# Patient Record
Sex: MALE | Race: WHITE | NOT HISPANIC OR LATINO | Employment: FULL TIME | URBAN - METROPOLITAN AREA
[De-identification: names, ages, dates, MRNs, and addresses within clinical notes are randomized per-mention and may not be internally consistent; named-entity substitution may affect disease eponyms.]

---

## 2019-09-19 ENCOUNTER — HOSPITAL ENCOUNTER (EMERGENCY)
Facility: HOSPITAL | Age: 44
Discharge: HOME | End: 2019-09-20
Attending: EMERGENCY MEDICINE

## 2019-09-19 DIAGNOSIS — S09.90XA CLOSED HEAD INJURY, INITIAL ENCOUNTER: ICD-10-CM

## 2019-09-19 DIAGNOSIS — S06.0X1A CONCUSSION WITH LOSS OF CONSCIOUSNESS OF 30 MINUTES OR LESS, INITIAL ENCOUNTER: ICD-10-CM

## 2019-09-19 DIAGNOSIS — R07.9 CHEST PAIN: ICD-10-CM

## 2019-09-19 DIAGNOSIS — S09.90XA INJURY OF HEAD, INITIAL ENCOUNTER: Primary | ICD-10-CM

## 2019-09-19 DIAGNOSIS — S20.219A CONTUSION OF CHEST WALL, UNSPECIFIED LATERALITY, INITIAL ENCOUNTER: ICD-10-CM
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
## 2019-09-19 LAB
ALBUMIN SERPL BCP-MCNC: 4.1 G/DL (ref 3.5–5.2)
ALP SERPL-CCNC: 79 U/L (ref 55–135)
ALT SERPL W/O P-5'-P-CCNC: 41 U/L (ref 10–44)
AMPHET+METHAMPHET UR QL: NEGATIVE
ANION GAP SERPL CALC-SCNC: 13 MMOL/L (ref 8–16)
AST SERPL-CCNC: 31 U/L (ref 10–40)
BARBITURATES UR QL SCN>200 NG/ML: NEGATIVE
BASOPHILS # BLD AUTO: 0.04 K/UL (ref 0–0.2)
BASOPHILS NFR BLD: 0.5 % (ref 0–1.9)
BENZODIAZ UR QL SCN>200 NG/ML: NEGATIVE
BILIRUB SERPL-MCNC: 0.2 MG/DL (ref 0.1–1)
BNP SERPL-MCNC: 14 PG/ML (ref 0–99)
BUN SERPL-MCNC: 10 MG/DL (ref 6–20)
BZE UR QL SCN: NEGATIVE
CALCIUM SERPL-MCNC: 9.3 MG/DL (ref 8.7–10.5)
CANNABINOIDS UR QL SCN: NEGATIVE
CHLORIDE SERPL-SCNC: 106 MMOL/L (ref 95–110)
CO2 SERPL-SCNC: 21 MMOL/L (ref 23–29)
CREAT SERPL-MCNC: 0.9 MG/DL (ref 0.5–1.4)
CREAT UR-MCNC: 22.8 MG/DL (ref 23–375)
DIFFERENTIAL METHOD: ABNORMAL
EOSINOPHIL # BLD AUTO: 0.2 K/UL (ref 0–0.5)
EOSINOPHIL NFR BLD: 2.4 % (ref 0–8)
ERYTHROCYTE [DISTWIDTH] IN BLOOD BY AUTOMATED COUNT: 12.8 % (ref 11.5–14.5)
EST. GFR  (AFRICAN AMERICAN): >60 ML/MIN/1.73 M^2
EST. GFR  (NON AFRICAN AMERICAN): >60 ML/MIN/1.73 M^2
ETHANOL SERPL-MCNC: 138 MG/DL
GLUCOSE SERPL-MCNC: 108 MG/DL (ref 70–110)
HCT VFR BLD AUTO: 43.5 % (ref 40–54)
HGB BLD-MCNC: 14.8 G/DL (ref 14–18)
LIPASE SERPL-CCNC: 17 U/L (ref 4–60)
LYMPHOCYTES # BLD AUTO: 1.6 K/UL (ref 1–4.8)
LYMPHOCYTES NFR BLD: 21 % (ref 18–48)
MCH RBC QN AUTO: 31.9 PG (ref 27–31)
MCHC RBC AUTO-ENTMCNC: 34 G/DL (ref 32–36)
MCV RBC AUTO: 94 FL (ref 82–98)
METHADONE UR QL SCN>300 NG/ML: NEGATIVE
MONOCYTES # BLD AUTO: 0.6 K/UL (ref 0.3–1)
MONOCYTES NFR BLD: 7.2 % (ref 4–15)
NEUTROPHILS # BLD AUTO: 5.3 K/UL (ref 1.8–7.7)
NEUTROPHILS NFR BLD: 69.2 % (ref 38–73)
OPIATES UR QL SCN: NEGATIVE
PCP UR QL SCN>25 NG/ML: NEGATIVE
PLATELET # BLD AUTO: 221 K/UL (ref 150–350)
PMV BLD AUTO: 10.5 FL (ref 9.2–12.9)
POTASSIUM SERPL-SCNC: 3.3 MMOL/L (ref 3.5–5.1)
PROT SERPL-MCNC: 8 G/DL (ref 6–8.4)
RBC # BLD AUTO: 4.64 M/UL (ref 4.6–6.2)
SODIUM SERPL-SCNC: 140 MMOL/L (ref 136–145)
TOXICOLOGY INFORMATION: ABNORMAL
TROPONIN I SERPL DL<=0.01 NG/ML-MCNC: <0.006 NG/ML (ref 0–0.03)
TROPONIN I SERPL DL<=0.01 NG/ML-MCNC: <0.006 NG/ML (ref 0–0.03)
WBC # BLD AUTO: 7.65 K/UL (ref 3.9–12.7)

## 2019-09-19 PROCEDURE — 25000003 PHARM REV CODE 250: Performed by: EMERGENCY MEDICINE

## 2019-09-19 PROCEDURE — 83690 ASSAY OF LIPASE: CPT

## 2019-09-19 PROCEDURE — 83880 ASSAY OF NATRIURETIC PEPTIDE: CPT

## 2019-09-19 PROCEDURE — 80053 COMPREHEN METABOLIC PANEL: CPT

## 2019-09-19 PROCEDURE — 93010 ELECTROCARDIOGRAM REPORT: CPT | Mod: ,,, | Performed by: INTERNAL MEDICINE

## 2019-09-19 PROCEDURE — 93010 EKG 12-LEAD: ICD-10-PCS | Mod: ,,, | Performed by: INTERNAL MEDICINE

## 2019-09-19 PROCEDURE — 99285 EMERGENCY DEPT VISIT HI MDM: CPT | Mod: 25

## 2019-09-19 PROCEDURE — 96374 THER/PROPH/DIAG INJ IV PUSH: CPT

## 2019-09-19 PROCEDURE — 84484 ASSAY OF TROPONIN QUANT: CPT

## 2019-09-19 PROCEDURE — 63600175 PHARM REV CODE 636 W HCPCS: Performed by: EMERGENCY MEDICINE

## 2019-09-19 PROCEDURE — 85025 COMPLETE CBC W/AUTO DIFF WBC: CPT

## 2019-09-19 PROCEDURE — 80320 DRUG SCREEN QUANTALCOHOLS: CPT

## 2019-09-19 PROCEDURE — 93005 ELECTROCARDIOGRAM TRACING: CPT

## 2019-09-19 PROCEDURE — 96375 TX/PRO/DX INJ NEW DRUG ADDON: CPT

## 2019-09-19 PROCEDURE — 80307 DRUG TEST PRSMV CHEM ANLYZR: CPT

## 2019-09-19 RX ORDER — METOCLOPRAMIDE HYDROCHLORIDE 5 MG/ML
10 INJECTION INTRAMUSCULAR; INTRAVENOUS
Status: COMPLETED | OUTPATIENT
Start: 2019-09-19 | End: 2019-09-19

## 2019-09-19 RX ORDER — ACETAMINOPHEN 500 MG
1000 TABLET ORAL
Status: COMPLETED | OUTPATIENT
Start: 2019-09-19 | End: 2019-09-19

## 2019-09-19 RX ORDER — KETOROLAC TROMETHAMINE 30 MG/ML
30 INJECTION, SOLUTION INTRAMUSCULAR; INTRAVENOUS
Status: COMPLETED | OUTPATIENT
Start: 2019-09-19 | End: 2019-09-19

## 2019-09-19 RX ADMIN — METOCLOPRAMIDE 10 MG: 5 INJECTION, SOLUTION INTRAMUSCULAR; INTRAVENOUS at 10:09

## 2019-09-19 RX ADMIN — KETOROLAC TROMETHAMINE 30 MG: 30 INJECTION, SOLUTION INTRAMUSCULAR at 10:09

## 2019-09-19 RX ADMIN — ACETAMINOPHEN 1000 MG: 500 TABLET ORAL at 10:09

## 2019-09-20 VITALS
OXYGEN SATURATION: 97 % | BODY MASS INDEX: 37.89 KG/M2 | HEART RATE: 58 BPM | DIASTOLIC BLOOD PRESSURE: 76 MMHG | HEIGHT: 68 IN | TEMPERATURE: 98 F | WEIGHT: 250 LBS | RESPIRATION RATE: 22 BRPM | SYSTOLIC BLOOD PRESSURE: 144 MMHG

## 2019-09-20 PROCEDURE — 25000003 PHARM REV CODE 250: Performed by: EMERGENCY MEDICINE

## 2019-09-20 RX ORDER — ONDANSETRON 4 MG/1
4 TABLET, ORALLY DISINTEGRATING ORAL EVERY 4 HOURS PRN
Qty: 20 TABLET | Refills: 1 | Status: SHIPPED | OUTPATIENT
Start: 2019-09-20

## 2019-09-20 RX ORDER — KETOROLAC TROMETHAMINE 10 MG/1
10 TABLET, FILM COATED ORAL EVERY 6 HOURS PRN
Qty: 20 TABLET | Refills: 1 | Status: SHIPPED | OUTPATIENT
Start: 2019-09-20

## 2019-09-20 RX ORDER — ONDANSETRON 4 MG/1
4 TABLET, ORALLY DISINTEGRATING ORAL
Status: COMPLETED | OUTPATIENT
Start: 2019-09-20 | End: 2019-09-20

## 2019-09-20 RX ORDER — POTASSIUM CHLORIDE 20 MEQ/15ML
40 SOLUTION ORAL
Status: COMPLETED | OUTPATIENT
Start: 2019-09-20 | End: 2019-09-20

## 2019-09-20 RX ADMIN — POTASSIUM CHLORIDE 40 MEQ: 20 SOLUTION ORAL at 01:09

## 2019-09-20 RX ADMIN — ONDANSETRON 4 MG: 4 TABLET, ORALLY DISINTEGRATING ORAL at 01:09

## 2019-09-20 NOTE — ED TRIAGE NOTES
"As noted pt fell from bar stool states he was not drunk but was coughing and apparently "passed out".  Pt placed in room and c- collar applied due to mech. Of injury. Pt continue to cough and states he has been sick for 3 days w/ cough but did go to work today.  "

## 2019-09-20 NOTE — ED PROVIDER NOTES
Encounter Date: 9/19/2019       History     Chief Complaint   Patient presents with    Head Injury     pt states he fell off a bar stool and hit his head. + loc. on arrival pt complains of posterior head pain, chest and left shoulder / arm pain. chest, arm and shoulder are directly affected by movement /  position. denies nausea or vomiting     43 yo male presents via EMS with head injury and chest pain. Patient reports dry cough for the last 3 days which he has been treating with Mucinex. He was at a bar shortly PTA and had a coughing fell and fell off his barstool, striking his right skull on the floor. Patient had LOC. He remembers his boss shaking him and assisting him to a nearby firehouse. Patient reports headache and nausea. No photophobia or neck pain. He was placed in a ccollar by ED staff here. Patient also reports left anterior chest pain that started after his fall. It is worse with touch or deep inspiration.    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     Patient has a remote (2001) history of drug abuse and requests NO NARCOTIC MEDICATIONS.    PMH: none        Review of patient's allergies indicates:  No Known Allergies  No past medical history on file.  No past surgical history on file.  No family history on file.  Social History     Tobacco Use    Smoking status: Not on file   Substance Use Topics    Alcohol use: Not on file    Drug use: Not on file     Review of Systems   Constitutional: Negative for fever.   HENT: Negative for sore throat.    Eyes: Negative for photophobia.   Respiratory: Negative for shortness of breath.    Cardiovascular: Positive for chest pain.   Gastrointestinal: Negative for abdominal pain.   Genitourinary: Negative for dysuria.   Musculoskeletal: Negative for neck pain.   Skin: Negative for rash.   Neurological: Positive for syncope (traumatic) and headaches.       Physical Exam     Initial Vitals  [09/19/19 2034]   BP Pulse Resp Temp SpO2   (!) 154/81 60 16 98.2 °F (36.8 °C) 98 %      MAP       --         Physical Exam    Nursing note and vitals reviewed.  Constitutional: He appears well-developed and well-nourished. He is not diaphoretic.   Awake, alert, nontoxic male.   HENT:   Head: Normocephalic.   Mouth/Throat: Oropharynx is clear and moist.   Large hematoma R parietal scalp.   Eyes: Conjunctivae and EOM are normal. Pupils are equal, round, and reactive to light.   Neck:   Ccollar in place. No midline TTP.   Cardiovascular: Normal rate, regular rhythm, normal heart sounds and intact distal pulses.   No murmur heard.  Pulmonary/Chest: Breath sounds normal. No respiratory distress. He has no wheezes. He has no rhonchi. He has no rales. He exhibits tenderness (left anterior and mid-sternal).   Abdominal: Soft. There is no tenderness. There is no rebound and no guarding.   Musculoskeletal: Normal range of motion. He exhibits no edema or tenderness.   Neurological: He is alert and oriented to person, place, and time. He has normal strength.   Moving all extremities   Skin: Skin is warm and dry.   Psychiatric: He has a normal mood and affect.         ED Course   Procedures  Labs Reviewed   CBC W/ AUTO DIFFERENTIAL - Abnormal; Notable for the following components:       Result Value    Mean Corpuscular Hemoglobin 31.9 (*)     All other components within normal limits   COMPREHENSIVE METABOLIC PANEL - Abnormal; Notable for the following components:    Potassium 3.3 (*)     CO2 21 (*)     All other components within normal limits   ALCOHOL,MEDICAL (ETHANOL) - Abnormal; Notable for the following components:    Alcohol, Medical, Serum 138 (*)     All other components within normal limits   DRUG SCREEN PANEL, URINE EMERGENCY - Abnormal; Notable for the following components:    Creatinine, Random Ur 22.8 (*)     All other components within normal limits   B-TYPE NATRIURETIC PEPTIDE   TROPONIN I   LIPASE   TROPONIN I      EKG Readings: (Independently Interpreted)   19:37: NSR, HR 67. Normal axis. Q waves in III, AVF. No STEMI.        Imaging Results          X-Ray Ribs 3 Views Bilateral (Final result)  Result time 09/20/19 10:13:45    Final result by Maci Jacobsen MD (09/20/19 10:13:45)                 Impression:      There is no evidence pneumothorax.      Electronically signed by: Maci Jacobsen MD  Date:    09/20/2019  Time:    10:13             Narrative:    EXAMINATION:  XR RIBS 3 VIEWS BILATERAL    CLINICAL HISTORY:  Unspecified fall, initial encounter    TECHNIQUE:  Rib films    COMPARISON:  None    FINDINGS:  There is no evidence displaced rib fracture or pneumothorax affecting either lung.                               X-Ray Chest AP Portable (Final result)  Result time 09/19/19 22:17:36    Final result by Loren Catherine MD (09/19/19 22:17:36)                 Impression:      No acute intrathoracic abnormality detected.      Electronically signed by: Loren Catherine  Date:    09/19/2019  Time:    22:17             Narrative:    EXAMINATION:  AP PORTABLE CHEST    CLINICAL HISTORY:  chest pain;    TECHNIQUE:  AP portable chest radiograph was submitted.    COMPARISON:  None.    FINDINGS:  AP portable chest radiograph demonstrates a cardiac silhouette within normal limits.  There is no focal consolidation, pneumothorax, or pleural effusion. The lung volumes are slightly low.                               CT Head Without Contrast (Final result)  Result time 09/19/19 22:11:37    Final result by Loren Catherine MD (09/19/19 22:11:37)                 Impression:      No acute intracranial abnormality detected.    No acute cervical fracture.      Electronically signed by: Loren Catherine  Date:    09/19/2019  Time:    22:11             Narrative:    EXAMINATION:  CT OF THE HEAD WITHOUT AND CT OF THE CERVICAL SPINE WITHOUT    CLINICAL HISTORY:  Headache, acute, norm neuro exam;; fall;    TECHNIQUE:  5 mm unenhanced  axial images were obtained from the skull base to the vertex.  1.25 mm axial images were obtained through the cervical spine.  Coronal sagittal reformatted images were provided.    COMPARISON:  None.    FINDINGS:  CT head: The ventricles, basal cisterns, and cortical sulci are within normal limits for patient's stated age. There is no acute intracranial hemorrhage, territorial infarct or mass effect, or midline shift.  In the visualized paranasal sinuses, there is mild mucoperiosteal thickening seen in the right inferior frontal sinus and bilateral ethmoid air cells.    CT cervical spine: There is mild reversal of the normal cervical lordosis.  There is no acute fracture or subluxation.  The bones are normally mineralized.                               CT Cervical Spine Without Contrast (Final result)  Result time 09/19/19 22:11:37    Final result by Loren Catherine MD (09/19/19 22:11:37)                 Impression:      No acute intracranial abnormality detected.    No acute cervical fracture.      Electronically signed by: Loren Catherine  Date:    09/19/2019  Time:    22:11             Narrative:    EXAMINATION:  CT OF THE HEAD WITHOUT AND CT OF THE CERVICAL SPINE WITHOUT    CLINICAL HISTORY:  Headache, acute, norm neuro exam;; fall;    TECHNIQUE:  5 mm unenhanced axial images were obtained from the skull base to the vertex.  1.25 mm axial images were obtained through the cervical spine.  Coronal sagittal reformatted images were provided.    COMPARISON:  None.    FINDINGS:  CT head: The ventricles, basal cisterns, and cortical sulci are within normal limits for patient's stated age. There is no acute intracranial hemorrhage, territorial infarct or mass effect, or midline shift.  In the visualized paranasal sinuses, there is mild mucoperiosteal thickening seen in the right inferior frontal sinus and bilateral ethmoid air cells.    CT cervical spine: There is mild reversal of the normal cervical lordosis.   There is no acute fracture or subluxation.  The bones are normally mineralized.                              X-Rays:   Independently Interpreted Readings:   Other Readings:  CXR NAD    Medical Decision Making:   History:   Old Medical Records: I decided to obtain old medical records.  Old Records Summarized: records from another hospital.  Independently Interpreted Test(s):   I have ordered and independently interpreted X-rays - see prior notes.  I have ordered and independently interpreted EKG Reading(s) - see prior notes  Clinical Tests:   Lab Tests: Ordered and Reviewed  Radiological Study: Ordered and Reviewed  Medical Tests: Ordered and Reviewed  ED Management:  43 yo male with cough x 3 days, then cough/syncope at bar, fall from [facility] with head trauma. Now has R head pain, left anterior chest pain.    Ddx includes cough syncope, dysrhythmia, ACS, intoxication, ICH, skull fx, cspine fx, other.    CT head and cspine reassuring. No ICH, skull fx, cspine fx. Ccollar removed.    EKG no STEMI.    CXR NAD.    Labs with EtOH 138. Negative troponin x 2, ACS ruled out. Mildly low K.    Patient had IV reglan and PO APAP prior to negative head CT, then IV toradol for pain to head and chest. He had PO KCl for low K.     I discussed negative workup with patient who is eager to go home and return to work on Monday (he will take tomorrow off). I recommended f/u to PMD when patient returns to [place]. I cautioned that patient did likely suffer concussion by head trauma + LOC with negative imaging, and discussed possible symptoms such as persistent headaches, nausea, and difficulty concentrating. Patient needs to f/u to neurologist for these if they occur.                         Clinical Impression:       ICD-10-CM ICD-9-CM   1. Injury of head, initial encounter S09.90XA 959.01   2. Chest pain R07.9 786.50   3. Fall W19.XXXA E888.9   4. Concussion with loss of consciousness of 30 minutes or less, initial encounter S06.0X1A  850.11   5. Closed head injury, initial encounter S09.90XA 959.01   6. Contusion of chest wall, unspecified laterality, initial encounter S20.219A 922.1                                Mary Norris MD  09/20/19 2152       Mary Norris MD  09/20/19 2154